# Patient Record
Sex: MALE | Race: WHITE
[De-identification: names, ages, dates, MRNs, and addresses within clinical notes are randomized per-mention and may not be internally consistent; named-entity substitution may affect disease eponyms.]

---

## 2018-06-14 ENCOUNTER — HOSPITAL ENCOUNTER (EMERGENCY)
Dept: HOSPITAL 17 - PHED | Age: 83
LOS: 1 days | Discharge: HOME | End: 2018-06-15
Payer: COMMERCIAL

## 2018-06-14 VITALS
RESPIRATION RATE: 20 BRPM | DIASTOLIC BLOOD PRESSURE: 84 MMHG | OXYGEN SATURATION: 96 % | TEMPERATURE: 98 F | HEART RATE: 95 BPM | SYSTOLIC BLOOD PRESSURE: 194 MMHG

## 2018-06-14 VITALS — OXYGEN SATURATION: 97 % | RESPIRATION RATE: 18 BRPM

## 2018-06-14 VITALS — WEIGHT: 176.37 LBS | HEIGHT: 69 IN | BODY MASS INDEX: 26.12 KG/M2

## 2018-06-14 DIAGNOSIS — I50.9: ICD-10-CM

## 2018-06-14 DIAGNOSIS — J44.1: Primary | ICD-10-CM

## 2018-06-14 DIAGNOSIS — R94.31: ICD-10-CM

## 2018-06-14 DIAGNOSIS — Z87.891: ICD-10-CM

## 2018-06-14 PROCEDURE — 71045 X-RAY EXAM CHEST 1 VIEW: CPT

## 2018-06-14 PROCEDURE — 94664 DEMO&/EVAL PT USE INHALER: CPT

## 2018-06-14 PROCEDURE — 94640 AIRWAY INHALATION TREATMENT: CPT

## 2018-06-14 PROCEDURE — 85025 COMPLETE CBC W/AUTO DIFF WBC: CPT

## 2018-06-14 PROCEDURE — 83880 ASSAY OF NATRIURETIC PEPTIDE: CPT

## 2018-06-14 PROCEDURE — 80048 BASIC METABOLIC PNL TOTAL CA: CPT

## 2018-06-14 PROCEDURE — 93005 ELECTROCARDIOGRAM TRACING: CPT

## 2018-06-14 PROCEDURE — 99285 EMERGENCY DEPT VISIT HI MDM: CPT

## 2018-06-14 PROCEDURE — 84484 ASSAY OF TROPONIN QUANT: CPT

## 2018-06-14 PROCEDURE — 96374 THER/PROPH/DIAG INJ IV PUSH: CPT

## 2018-06-14 PROCEDURE — 83735 ASSAY OF MAGNESIUM: CPT

## 2018-06-14 NOTE — PD
HPI


Chief Complaint:  Respiratory Symptoms


Time Seen by Provider:  23:31


Travel History


International Travel<30 days:  No


Contact w/Intl Traveler<30days:  No


Traveled to known affect area:  No





History of Present Illness


HPI


83-year-old male presents to the emergency department by private transportation 

for complaint of shortness of breath 2 days.  Patient has had wheezing.  

Patient has history of COPD and prior history of CHF.  Patient currently is on 

3 different inhalers.  Patient was recently taken off his Symbicort due to 

steroid related thrush.  Patient's symptoms of mouth sensitivity have improved 

off of Symbicort.  Patient states since starting new inhaler, Stiolto, however 

symptoms have worsened as far as his shortness of breath and wheezing.  No 

fever no chills or productive cough congestion patient has had mild ankle 

edema.  No report of orthopnea has had dyspnea on exertion.  Patient denies 

chest pain.  No prior visits to this facility.  Patient is under care of Dr. Gonzalez.





[facility]


Past Medical History


*** Narrative Medical


COPD CHF no tobacco use 30 years occasional alcohol use; nursing notes reviewed


Asthma:  Yes


Congestive Heart Failure:  Yes


COPD:  Yes


Diminished Hearing:  No


Respiratory:  Yes (COPD, emphysema, asthma)


Immunizations Current:  Yes


Tetanus Vaccination:  < 5 Years


Influenza Vaccination:  Yes





Past Surgical History


Tonsillectomy:  Yes





Social History


Alcohol Use:  Yes (2 GLASSES OF WINE DAILY)


Tobacco Use:  No (QUIT 30 + YRS AG0)


Substance Use:  No





Allergies-Medications


(Allergen,Severity, Reaction):  


Coded Allergies:  


     penicillin G (Verified  Allergy, Unknown, 6/14/18)


Reported Meds & Prescriptions





Reported Meds & Active Scripts


Active


Proair Hfa 8.5 GM Inh (Albuterol Sulfate) 90 Mcg/Act Aer 2 Puff INH Q4-6H PRN


     108 mcg/actuation


Medrol Dosepak (Methylprednisolone) 4 Mg Dspk 4 Mg PO AS DIRECTED


     Per Pharmacist direction


Reported


Stiolto Respimat Inh (Tiotropium-Olodaterol Inh)  2.5-2.5 Mcg/Act Aero 2 Puff 

INH DAILY


Proair Hfa 8.5 GM Inh (Albuterol Sulfate) 90 Mcg/Act Aer 2 Puff INH Q4-6H PRN


     108 mcg/actuation


Singulair (Montelukast Sodium) 10 Mg Tab 10 Mg PO HS


Symbicort Inh (Budesonide/Formoterol Fumarate) 160-4.5 Mcg/Act Aero 2 Puff INH 

Q12HR








Review of Systems


Except as stated in HPI:  all other systems reviewed are Neg


General / Constitutional:  No: Fever, Chills


HENT:  No: Congestion


Cardiovascular:  Positive: Dyspnea on exertion, No: Chest Pain or Discomfort, 

Palpitations, Diaphoresis


Respiratory:  Positive: Cough, Shortness of Breath, Wheezing, No: Hemoptysis, 

Pleuritic Pain


Gastrointestinal:  No: Nausea, Vomiting, Abdominal Pain


Genitourinary:  No: Flank Pain


Musculoskeletal:  No: Myalgias, Arthralgias


Skin:  No Rash


Neurologic:  No: Weakness, Dizziness, Syncope


Psychiatric:  No: Anxiety


Endocrine:  No: Heat Intolerance


Hematologic/Lymphatic:  No: Easy Bruising





Physical Exam


Narrative


GENERAL: Well-developed well-nourished male in no acute distress with 

expiratory wheezes


SKIN: Warm and dry.


HEAD: Normocephalic.


EYES: No scleral icterus. No injection or drainage. 


NECK: Supple, trachea midline. No JVD or lymphadenopathy.


CARDIOVASCULAR: Regular rate and rhythm without murmurs, gallops, or rubs. 


RESPIRATORY: Breath sounds equal bilaterally diminished with expiratory 

wheezing. No accessory muscle use.


GASTROINTESTINAL: Abdomen soft, non-tender, nondistended. 


MUSCULOSKELETAL: No cyanosis, trace bilateral ankle edema. 


BACK: Nontender without obvious deformity. No CVA tenderness.








Data


Data


Last Documented VS





Vital Signs








  Date Time  Temp Pulse Resp B/P (MAP) Pulse Ox O2 Delivery O2 Flow Rate FiO2


 


6/15/18 00:55  86 18 160/75 (103)  Room Air  


 


6/14/18 23:37     97   


 


6/14/18 23:08 98.0       








Orders





 Orders


Complete Blood Count With Diff (6/14/18 23:31)


Basic Metabolic Panel (Bmp) (6/14/18 23:31)


B-Type Natriuretic Peptide (6/14/18 23:31)


Magnesium (Mg) (6/14/18 23:31)


Troponin I (6/14/18 23:31)


Iv Access Insert/Monitor (6/14/18 23:31)


Oximetry (6/14/18 23:31)


Chest, Single Ap (6/14/18 23:31)


Methylprednisolone So Succ Inj (Solumedr (6/14/18 23:45)


Albuterol Neb (Albuterol Neb) (6/14/18 23:45)


Electrocardiogram (6/15/18 )


Albuterol Neb (Albuterol Neb) (6/15/18 01:00)


Albuterol Neb (Albuterol Neb) (6/15/18 01:00)


Ed Discharge Order (6/15/18 02:30)





Labs





Laboratory Tests








Test


  6/14/18


23:31 6/14/18


23:45


 


White Blood Count 9.5 TH/MM3  


 


Red Blood Count 4.39 MIL/MM3  


 


Hemoglobin 14.3 GM/DL  


 


Hematocrit 42.6 %  


 


Mean Corpuscular Volume 96.9 FL  


 


Mean Corpuscular Hemoglobin 32.6 PG  


 


Mean Corpuscular Hemoglobin


Concent 33.6 % 


  


 


 


Red Cell Distribution Width 12.4 %  


 


Platelet Count 193 TH/MM3  


 


Mean Platelet Volume 7.5 FL  


 


Neutrophils (%) (Auto) 60.0 %  


 


Lymphocytes (%) (Auto) 21.4 %  


 


Monocytes (%) (Auto) 7.5 %  


 


Eosinophils (%) (Auto) 10.0 %  


 


Basophils (%) (Auto) 1.1 %  


 


Neutrophils # (Auto) 5.8 TH/MM3  


 


Lymphocytes # (Auto) 2.0 TH/MM3  


 


Monocytes # (Auto) 0.7 TH/MM3  


 


Eosinophils # (Auto) 0.9 TH/MM3  


 


Basophils # (Auto) 0.1 TH/MM3  


 


CBC Comment DIFF FINAL  


 


Differential Comment   


 


Blood Urea Nitrogen  12 MG/DL 


 


Creatinine  0.81 MG/DL 


 


Random Glucose  117 MG/DL 


 


Calcium Level  8.6 MG/DL 


 


Magnesium Level  2.3 MG/DL 


 


Sodium Level  142 MEQ/L 


 


Potassium Level  3.8 MEQ/L 


 


Chloride Level  106 MEQ/L 


 


Carbon Dioxide Level  28.6 MEQ/L 


 


Anion Gap  7 MEQ/L 


 


Estimat Glomerular Filtration


Rate 


  91 ML/MIN 


 


 


Troponin I


  


  LESS THAN 0.02


NG/ML


 


B-Type Natriuretic Peptide  45 PG/ML 











MDM


Medical Decision Making


Medical Screen Exam Complete:  Yes


Emergency Medical Condition:  Yes


Medical Record Reviewed:  Yes


Interpretation(s)


CBC & BMP Diagram


6/14/18 23:31








6/14/18 23:45








Calcium Level 8.6, Magnesium Level 2.3








Vital Signs








  Date Time  Temp Pulse Resp B/P (MAP) Pulse Ox O2 Delivery O2 Flow Rate FiO2


 


6/15/18 00:55  86 18 160/75 (103)  Room Air  


 


6/14/18 23:37   18  97 Room Air  


 


6/14/18 23:37   18  97 Room Air  


 


6/14/18 23:08 98.0 95 20 194/84 (120) 96   





troponin I: Less than 0.02, elevated; BNP: 45, elevated





Last Impressions








Chest X-Ray 6/14/18 2331 Signed





Impressions: 





 CONCLUSION: 





 Mild interstitial prominence which may represent underlying interstitial diseas





 e.





 Rounded area of density in the left lower chest related either to the anterior 





 left fifth rib versus underlying pulmonary nodule. This area could be further e





 valuated with a CT examination of the chest at some point.





  





 





EKG: Normal sinus rhythm rate 87 no acute ST elevation or injury pattern age-

indeterminate QS septally


Differential Diagnosis


Exacerbation COPD, CHF, bronchitis, pneumonia, adverse medication reaction


Narrative Course


Patient placed on monitor with continuous pulse oximetry albuterol updraft 

treatment ordered along with Solu-Medrol basic labs collected chest x-ray and 

EKG ordered


Patient symptomatically improved but persistent wheezing additional albuterol 

nebs 2 administered


Lab values found to be grossly normal range specifically troponin I is less 

than 0.02 and BNP is not elevated at 45 chest x-ray shows interstitial changes 

nonspecific and no cardiomegaly or effusion or infiltrate.


EKG sinus rhythm no acute ST elevation injury pattern age-indeterminate QS 

septally


After 3 albuterol nebulized treatments patient is clinically markedly improved 

room air O2 saturation 96% no wheezing no work of breathing patient at this 

time is stable for outpatient management and follow-up with his pulmonologist 

Dr. Gonzalez





Diagnosis





 Primary Impression:  


 COPD exacerbation


Referrals:  


Primary Care Physician


1 day


Patient Instructions:  General Instructions





***Additional Instructions:  


Follow-up with your primary care provider 1 day


Continue current medications as presently prescribed and add Medrol Dosepak 

taper


Return to the emergency department for any concerns or change in condition


Take acetaminophen/Tylenol as needed for fever 100.4F or greater


***Med/Other Pt SpecificInfo:  Prescription(s) given


Scripts


Albuterol Neb (Albuterol Neb) 2.5 Mg/3 Ml Neb


2.5 MG NEB Q4HR NEB Y for SHORTNESS OF BREATH, #60 NEBULE 0 Refills


   Prov: Cassy Allred MD         6/15/18 


Nebulizer/Adult Mask (Nebulizer/Adult Mask) 1 Kit Kit


KIT .XX AS DIRECTED for Breathing Treatment, #1 0 Refills


   Prov: Cassy Allred MD         6/15/18 


Albuterol 8.5 GM Inh (Proair Hfa 8.5 GM Inh) 90 Mcg/Act Aer


2 PUFF INH Q4-6H Y for SHORTNESS OF BREATH, #1 INHALER 0 Refills


   108 mcg/actuation


   Prov: Cassy Allred MD         6/15/18 


Methylprednisolone Dosepak (Medrol Dosepak) 4 Mg Dspk


4 MG PO AS DIRECTED, #1 DSPK 0 Refills


   Per Pharmacist direction


   Prov: Cassy Allred MD         6/15/18


Disposition:  01 DISCHARGE HOME


Condition:  Stable











Cassy Allred MD Jun 14, 2018 23:48

## 2018-06-15 VITALS
DIASTOLIC BLOOD PRESSURE: 73 MMHG | SYSTOLIC BLOOD PRESSURE: 150 MMHG | RESPIRATION RATE: 18 BRPM | OXYGEN SATURATION: 96 % | HEART RATE: 84 BPM

## 2018-06-15 VITALS — DIASTOLIC BLOOD PRESSURE: 75 MMHG | SYSTOLIC BLOOD PRESSURE: 160 MMHG | RESPIRATION RATE: 18 BRPM | HEART RATE: 86 BPM

## 2018-06-15 LAB
BASOPHILS # BLD AUTO: 0.1 TH/MM3 (ref 0–0.2)
BASOPHILS NFR BLD: 1.1 % (ref 0–2)
BUN SERPL-MCNC: 12 MG/DL (ref 7–18)
CALCIUM SERPL-MCNC: 8.6 MG/DL (ref 8.5–10.1)
CHLORIDE SERPL-SCNC: 106 MEQ/L (ref 98–107)
CREAT SERPL-MCNC: 0.81 MG/DL (ref 0.6–1.3)
EOSINOPHIL # BLD: 0.9 TH/MM3 (ref 0–0.4)
EOSINOPHIL NFR BLD: 10 % (ref 0–4)
ERYTHROCYTE [DISTWIDTH] IN BLOOD BY AUTOMATED COUNT: 12.4 % (ref 11.6–17.2)
GFR SERPLBLD BASED ON 1.73 SQ M-ARVRAT: 91 ML/MIN (ref 89–?)
GLUCOSE SERPL-MCNC: 117 MG/DL (ref 74–106)
HCO3 BLD-SCNC: 28.6 MEQ/L (ref 21–32)
HCT VFR BLD CALC: 42.6 % (ref 39–51)
HGB BLD-MCNC: 14.3 GM/DL (ref 13–17)
LYMPHOCYTES # BLD AUTO: 2 TH/MM3 (ref 1–4.8)
LYMPHOCYTES NFR BLD AUTO: 21.4 % (ref 9–44)
MAGNESIUM SERPL-MCNC: 2.3 MG/DL (ref 1.5–2.5)
MCH RBC QN AUTO: 32.6 PG (ref 27–34)
MCHC RBC AUTO-ENTMCNC: 33.6 % (ref 32–36)
MCV RBC AUTO: 96.9 FL (ref 80–100)
MONOCYTE #: 0.7 TH/MM3 (ref 0–0.9)
MONOCYTES NFR BLD: 7.5 % (ref 0–8)
NEUTROPHILS # BLD AUTO: 5.8 TH/MM3 (ref 1.8–7.7)
NEUTROPHILS NFR BLD AUTO: 60 % (ref 16–70)
PLATELET # BLD: 193 TH/MM3 (ref 150–450)
PMV BLD AUTO: 7.5 FL (ref 7–11)
RBC # BLD AUTO: 4.39 MIL/MM3 (ref 4.5–5.9)
SODIUM SERPL-SCNC: 142 MEQ/L (ref 136–145)
TROPONIN I SERPL-MCNC: (no result) NG/ML (ref 0.02–0.05)
WBC # BLD AUTO: 9.5 TH/MM3 (ref 4–11)

## 2018-06-15 NOTE — EKG
Date Performed: 06/15/2018       Time Performed: 01:40:47

 

PTAGE:      83 years

 

EKG:      Sinus rhythm 

 

 SEPTAL MYOCARDIAL INFARCTION ABNORMAL ECG S 

 

NO PREVIOUS TRACING            

 

DOCTOR:   Tee Johnson  Interpretating Date/Time  06/15/2018 07:04:36

## 2018-06-15 NOTE — RADRPT
EXAM DATE:  6/15/2018 12:27 AM EDT

AGE/SEX:        83 years / Male



INDICATIONS:  Shortness of breath starting today



CLINICAL DATA:  This is the patient's initial encounter. Patient reports that signs and symptoms have
 been present for 1 day and indicates a pain score of 0/10. 

                                                                          

MEDICAL/SURGICAL HISTORY:       Chronic obstructive pulmonary disease. . Left rotator cuff repair



COMPARISON:      No prior exams available for comparison. 





FINDINGS:  

The heart size is normal. There is some mild prominence in interstitium in the mid to lower lungs. Th
ere is a rounded area of density seen in the left lower chest likely related to the fifth rib however
 an underlying pulmonary nodule cannot be excluded. Surgical fasteners are seen at the left humeral j
oint. There is degenerative change at the right glenohumeral joint.



CONCLUSION: 

Mild interstitial prominence which may represent underlying interstitial disease.

Rounded area of density in the left lower chest related either to the anterior left fifth rib versus 
underlying pulmonary nodule. This area could be further evaluated with a CT examination of the chest 
at some point.



Electronically signed by: Jorge Simpson MD  6/15/2018 12:38 AM EDT